# Patient Record
Sex: FEMALE | Race: WHITE | URBAN - METROPOLITAN AREA
[De-identification: names, ages, dates, MRNs, and addresses within clinical notes are randomized per-mention and may not be internally consistent; named-entity substitution may affect disease eponyms.]

---

## 2019-02-11 ENCOUNTER — HISTORICAL (OUTPATIENT)
Dept: LAB | Facility: HOSPITAL | Age: 34
End: 2019-02-11

## 2019-02-11 ENCOUNTER — HISTORICAL (OUTPATIENT)
Dept: PREADMISSION TESTING | Facility: HOSPITAL | Age: 34
End: 2019-02-11

## 2019-02-11 LAB
ABS NEUT (OLG): 5.32 X10(3)/MCL (ref 2.1–9.2)
APTT PPP: 29 SECOND(S) (ref 24.8–36.9)
BASOPHILS # BLD AUTO: 0.1 X10(3)/MCL (ref 0–0.2)
BASOPHILS NFR BLD AUTO: 1 %
EOSINOPHIL # BLD AUTO: 0.2 X10(3)/MCL (ref 0–0.9)
EOSINOPHIL NFR BLD AUTO: 2 %
ERYTHROCYTE [DISTWIDTH] IN BLOOD BY AUTOMATED COUNT: 11.9 % (ref 11.5–17)
HCT VFR BLD AUTO: 43.4 % (ref 37–47)
HGB BLD-MCNC: 14 GM/DL (ref 12–16)
INR PPP: 1 (ref 0–1.3)
LYMPHOCYTES # BLD AUTO: 2.5 X10(3)/MCL (ref 0.6–4.6)
LYMPHOCYTES NFR BLD AUTO: 29 %
MCH RBC QN AUTO: 29.7 PG (ref 27–31)
MCHC RBC AUTO-ENTMCNC: 32.3 GM/DL (ref 33–36)
MCV RBC AUTO: 92.1 FL (ref 80–94)
MONOCYTES # BLD AUTO: 0.5 X10(3)/MCL (ref 0.1–1.3)
MONOCYTES NFR BLD AUTO: 6 %
NEUTROPHILS # BLD AUTO: 5.32 X10(3)/MCL (ref 2.1–9.2)
NEUTROPHILS NFR BLD AUTO: 62 %
PLATELET # BLD AUTO: 257 X10(3)/MCL (ref 130–400)
PMV BLD AUTO: 11 FL (ref 9.4–12.4)
PROTHROMBIN TIME: 13.6 SECOND(S) (ref 12.2–14.7)
RBC # BLD AUTO: 4.71 X10(6)/MCL (ref 4.2–5.4)
WBC # SPEC AUTO: 8.6 X10(3)/MCL (ref 4.5–11.5)

## 2019-02-19 ENCOUNTER — HISTORICAL (OUTPATIENT)
Dept: ADMINISTRATIVE | Facility: HOSPITAL | Age: 34
End: 2019-02-19

## 2022-04-30 NOTE — OP NOTE
DATE OF SURGERY:        SURGEON:  Vaughn Patel MD    PREOPERATIVE DIAGNOSIS:  Forehead mass.    POSTOPERATIVE DIAGNOSIS:  Forehead mass.    OPERATION PERFORMED:  Excisional biopsy of forehead mass.  The mass measured approximately 5 cm.    DESCRIPTION OF PROCEDURE:  With proper consent information, the patient brought to the operating room, placed on the operating table in supine position.   Under satisfactory endotracheal intubation and general anesthesia, the patient was placed asleep.  Because of the level of the mass in the mid left forehead, a vertical incision was made in the crease line.  This was brought down in subgaleal fashion underneath the frontal branch of the facial nerve.  The mass was very extensive and adherent to the galea.  It was meticulously and sharply dissected.  All the mass was removed in total.  Prior to surgery, the patient was instructed that the mass could return if it was a lipomatous lesion.  She understood the risks and complications.  She also understood the risk of frontal branch nerve disruption and paralysis.  The area was irrigated with copious amounts of solution, and was closed with 5-0 PDS, and 5-0 nylon suture.        ______________________________  Vaughn Patel MD    [facility]/UD  DD:  02/21/2019  Time:  08:31AM  DT:  02/21/2019  Time:  09:00AM  Job #:  [id number]